# Patient Record
Sex: MALE | Race: WHITE | NOT HISPANIC OR LATINO | URBAN - METROPOLITAN AREA
[De-identification: names, ages, dates, MRNs, and addresses within clinical notes are randomized per-mention and may not be internally consistent; named-entity substitution may affect disease eponyms.]

---

## 2018-08-13 ENCOUNTER — OFFICE VISIT (OUTPATIENT)
Dept: URBAN - METROPOLITAN AREA CLINIC 18 | Facility: CLINIC | Age: 28
End: 2018-08-13
Payer: COMMERCIAL

## 2018-08-13 DIAGNOSIS — H52.13 MYOPIA, BILATERAL: ICD-10-CM

## 2018-08-13 DIAGNOSIS — H52.223 REGULAR ASTIGMATISM, BILATERAL: Primary | ICD-10-CM

## 2018-08-13 PROCEDURE — 92015 DETERMINE REFRACTIVE STATE: CPT | Performed by: OPTOMETRIST

## 2018-08-13 PROCEDURE — 92014 COMPRE OPH EXAM EST PT 1/>: CPT | Performed by: OPTOMETRIST

## 2018-08-13 ASSESSMENT — KERATOMETRY
OD: 42.25
OS: 42.25

## 2018-08-13 ASSESSMENT — INTRAOCULAR PRESSURE
OD: 14
OS: 17

## 2018-08-13 ASSESSMENT — VISUAL ACUITY
OS: 20/20
OD: 20/20

## 2018-08-13 NOTE — IMPRESSION/PLAN
Impression: Regular astigmatism, bilateral: H52.223. Condition: established, stable. Plan: Discussed diagnosis in detail with patient. New SRx was given today.

## 2021-12-03 ENCOUNTER — OFFICE VISIT (OUTPATIENT)
Dept: URBAN - METROPOLITAN AREA CLINIC 18 | Facility: CLINIC | Age: 31
End: 2021-12-03
Payer: COMMERCIAL

## 2021-12-03 PROCEDURE — 92002 INTRM OPH EXAM NEW PATIENT: CPT | Performed by: OPTOMETRIST

## 2021-12-03 ASSESSMENT — INTRAOCULAR PRESSURE
OS: 13
OD: 14

## 2021-12-03 ASSESSMENT — VISUAL ACUITY
OD: 20/20
OS: 20/20

## 2021-12-03 NOTE — IMPRESSION/PLAN
Impression: Myopia, bilateral: H52.13. Plan: Finalized [employer]. Patient education on appropriate options of eye glasses. Return to clinic in one year for complete eye exam and refraction.

## 2024-04-01 ENCOUNTER — OFFICE VISIT (OUTPATIENT)
Dept: URBAN - METROPOLITAN AREA CLINIC 18 | Facility: CLINIC | Age: 34
End: 2024-04-01
Payer: COMMERCIAL

## 2024-04-01 DIAGNOSIS — H52.13 MYOPIA, BILATERAL: Primary | ICD-10-CM

## 2024-04-01 PROCEDURE — 92012 INTRM OPH EXAM EST PATIENT: CPT

## 2024-04-01 PROCEDURE — 92310 CONTACT LENS FITTING OU: CPT

## 2024-04-01 ASSESSMENT — KERATOMETRY
OD: 42.13
OS: 42.13

## 2024-04-01 ASSESSMENT — INTRAOCULAR PRESSURE
OD: 19
OS: 20

## 2024-04-01 ASSESSMENT — VISUAL ACUITY
OS: 20/20
OD: 20/20

## 2024-05-21 ENCOUNTER — OFFICE VISIT (OUTPATIENT)
Dept: URBAN - METROPOLITAN AREA CLINIC 18 | Facility: CLINIC | Age: 34
End: 2024-05-21

## 2024-05-21 DIAGNOSIS — H52.13 MYOPIA, BILATERAL: ICD-10-CM

## 2024-05-21 DIAGNOSIS — H52.223 REGULAR ASTIGMATISM, BILATERAL: Primary | ICD-10-CM

## 2024-05-21 PROCEDURE — 92310 CONTACT LENS FITTING OU: CPT
